# Patient Record
Sex: MALE | Race: BLACK OR AFRICAN AMERICAN
[De-identification: names, ages, dates, MRNs, and addresses within clinical notes are randomized per-mention and may not be internally consistent; named-entity substitution may affect disease eponyms.]

---

## 2019-03-28 ENCOUNTER — HOSPITAL ENCOUNTER (EMERGENCY)
Dept: HOSPITAL 63 - ER | Age: 29
Discharge: HOME | End: 2019-03-28
Payer: COMMERCIAL

## 2019-03-28 VITALS — HEIGHT: 67 IN | BODY MASS INDEX: 37.67 KG/M2 | WEIGHT: 240 LBS

## 2019-03-28 VITALS — SYSTOLIC BLOOD PRESSURE: 132 MMHG | DIASTOLIC BLOOD PRESSURE: 84 MMHG

## 2019-03-28 DIAGNOSIS — J45.909: ICD-10-CM

## 2019-03-28 DIAGNOSIS — K08.89: Primary | ICD-10-CM

## 2019-03-28 DIAGNOSIS — F17.210: ICD-10-CM

## 2019-03-28 DIAGNOSIS — Z88.0: ICD-10-CM

## 2019-03-28 PROCEDURE — 99283 EMERGENCY DEPT VISIT LOW MDM: CPT

## 2019-03-28 NOTE — PHYS DOC
Past History


Past Medical History:  Asthma


Past Surgical History:  No Surgical History


Smoking:  Cigarettes


Alcohol Use:  Occasionally


Drug Use:  None





Adult General


Chief Complaint


Chief Complaint:  DENTAL PROBLEM





HPI


HPI





Patient is a 28-year-old male who presents with right lower dental pain. 

Patient has been having issues with his next to last tooth on the right side 

for a while but another piece broke yesterday and has been having increased 

pain since that time. Patient denies any fever. No difficulty swallowing. 

Increased pain with cold exposure.[]





Review of Systems


Review of Systems





Constitutional: Denies fever or chills []


Eyes: Denies change in visual acuity, redness, or eye pain []


HENT: Denies nasal congestion or sore throat []


Respiratory: Denies cough or shortness of breath []


Cardiovascular: No chest pain or palpitations[]


GI: Denies abdominal pain, nausea, vomiting, bloody stools or diarrhea []


: Denies dysuria or hematuria []


Musculoskeletal: Denies back pain or joint pain []


Integument: Denies rash or skin lesions []


Neurologic: Denies headache, focal weakness or sensory changes []


Endocrine: Denies polyuria or polydipsia []





All other systems were reviewed and found to be within normal limits, except as 

documented in this note.





Allergies


Allergies





Allergies








Coded Allergies Type Severity Reaction Last Updated Verified


 


  Penicillins Allergy Intermediate  3/28/19 Yes











Physical Exam


Physical Exam





Constitutional: Well developed, well nourished, no acute distress, non-toxic 

appearance. []


HENT: Normocephalic, atraumatic, bilateral external ears normal, oropharynx 

moist, no oral exudates, nose normal. Tooth #31 has a break on the posterior 

aspect. There is no drainable abscess present. No palpable swelling in the 

region. []


Eyes: PERRLA, EOMI, conjunctiva normal, no discharge. [] 


Neck: Normal range of motion, no tenderness, supple, no stridor. [] 


Cardiovascular:Heart rate regular rhythm, no murmur []


Lungs & Thorax:  Bilateral breath sounds clear to auscultation []


Abdomen: Not examined. [] 


Skin: Warm, dry, no erythema, no rash. [] 


Back: Not examined. [] 


Extremities: No tenderness, no cyanosis, no clubbing, ROM intact, no edema. [] 


Neurologic: Alert and oriented X 3, normal motor function, normal sensory 

function, no focal deficits noted. []


Psychologic: Affect normal, judgement normal, mood normal. []





EKG


EKG


[]





Radiology/Procedures


Radiology/Procedures


[]





Course & Med Decision Making


Course & Med Decision Making


Pertinent Labs and Imaging studies reviewed. (See chart for details)





Medical decision making: Patient with tooth #31 pain. Will give him a list for 

local clinics that provide dental care. No evidence of Rick exam Gen. a, no 

evidence of large drainable abscess. We will cover for the possibility of a 

periapical abscess given his penicillin allergy.[]





Dragon Disclaimer


Dragon Disclaimer


This electronic medical record was generated, in whole or in part, using a 

voice recognition dictation system.





Departure


Departure:


Impression:  


 Primary Impression:  


 Pain, dental


Disposition:  01 HOME, SELF-CARE


Condition:  IMPROVED


Patient Instructions:  Dental Pain





Additional Instructions:  


Follow-up with your regular doctor or dentist in 2 days. A list of local dental 

clinics is being provided for you. Take the medication as prescribed. Return to 

the ER if worsening pain or any other concerns.


Scripts


Clindamycin Hcl (CLINDAMYCIN HCL) 150 Mg Capsule


2 CAP PO QID for dental infection, #80 CAP


   Prov: JEM HERRERA DO         3/28/19 


Hydrocodone Bit/Acetaminophen (NORCO 5-325 TABLET) 1 Each Tablet


1-2 TAB PO Q4-6HRS for severe pain, #20 TAB


   Prov: JEM HERRERA DO         3/28/19 


Meloxicam (MELOXICAM) 7.5 Mg Tablet


7.5 MG PO DAILY for PAIN, #20 TAB


   Prov: JEM HERRERA DO         3/28/19











JEM HERRERA DO Mar 28, 2019 10:38